# Patient Record
Sex: MALE | URBAN - METROPOLITAN AREA
[De-identification: names, ages, dates, MRNs, and addresses within clinical notes are randomized per-mention and may not be internally consistent; named-entity substitution may affect disease eponyms.]

---

## 2020-12-15 ENCOUNTER — APPOINTMENT (RX ONLY)
Dept: URBAN - METROPOLITAN AREA CLINIC 169 | Facility: CLINIC | Age: 60
Setting detail: DERMATOLOGY
End: 2020-12-15

## 2020-12-15 DIAGNOSIS — Z85.72 PERSONAL HISTORY OF NON-HODGKIN LYMPHOMAS: ICD-10-CM

## 2020-12-15 DIAGNOSIS — L82.1 OTHER SEBORRHEIC KERATOSIS: ICD-10-CM

## 2020-12-15 DIAGNOSIS — D485 NEOPLASM OF UNCERTAIN BEHAVIOR OF SKIN: ICD-10-CM

## 2020-12-15 DIAGNOSIS — D18.0 HEMANGIOMA: ICD-10-CM

## 2020-12-15 DIAGNOSIS — D22 MELANOCYTIC NEVI: ICD-10-CM

## 2020-12-15 DIAGNOSIS — L81.4 OTHER MELANIN HYPERPIGMENTATION: ICD-10-CM

## 2020-12-15 DIAGNOSIS — Z85.820 PERSONAL HISTORY OF MALIGNANT MELANOMA OF SKIN: ICD-10-CM

## 2020-12-15 PROBLEM — D22.5 MELANOCYTIC NEVI OF TRUNK: Status: ACTIVE | Noted: 2020-12-15

## 2020-12-15 PROBLEM — D18.01 HEMANGIOMA OF SKIN AND SUBCUTANEOUS TISSUE: Status: ACTIVE | Noted: 2020-12-15

## 2020-12-15 PROBLEM — D48.5 NEOPLASM OF UNCERTAIN BEHAVIOR OF SKIN: Status: ACTIVE | Noted: 2020-12-15

## 2020-12-15 PROCEDURE — ? ADDITIONAL NOTES

## 2020-12-15 PROCEDURE — ? BIOPSY BY SHAVE METHOD

## 2020-12-15 PROCEDURE — ? COUNSELING

## 2020-12-15 PROCEDURE — 99213 OFFICE O/P EST LOW 20 MIN: CPT | Mod: 25

## 2020-12-15 PROCEDURE — 11102 TANGNTL BX SKIN SINGLE LES: CPT

## 2020-12-15 ASSESSMENT — LOCATION ZONE DERM
LOCATION ZONE: LEG
LOCATION ZONE: TRUNK
LOCATION ZONE: LEG
LOCATION ZONE: TRUNK
LOCATION ZONE: NECK

## 2020-12-15 ASSESSMENT — LOCATION DETAILED DESCRIPTION DERM
LOCATION DETAILED: EPIGASTRIC SKIN
LOCATION DETAILED: RIGHT INFERIOR MEDIAL MIDBACK
LOCATION DETAILED: LEFT MID-UPPER BACK
LOCATION DETAILED: LEFT MID-UPPER BACK
LOCATION DETAILED: RIGHT BUTTOCK
LOCATION DETAILED: LEFT MEDIAL SUPERIOR CHEST
LOCATION DETAILED: RIGHT CLAVICULAR NECK
LOCATION DETAILED: RIGHT DISTAL LATERAL POSTERIOR THIGH
LOCATION DETAILED: RIGHT ANTERIOR PROXIMAL THIGH
LOCATION DETAILED: LEFT SUPERIOR MEDIAL UPPER BACK

## 2020-12-15 ASSESSMENT — LOCATION SIMPLE DESCRIPTION DERM
LOCATION SIMPLE: LEFT UPPER BACK
LOCATION SIMPLE: ABDOMEN
LOCATION SIMPLE: RIGHT THIGH
LOCATION SIMPLE: LEFT UPPER BACK
LOCATION SIMPLE: RIGHT POSTERIOR THIGH
LOCATION SIMPLE: RIGHT LOWER BACK
LOCATION SIMPLE: RIGHT ANTERIOR NECK
LOCATION SIMPLE: RIGHT BUTTOCK
LOCATION SIMPLE: CHEST

## 2020-12-15 NOTE — PROCEDURE: ADDITIONAL NOTES
Additional Notes: Patient consent was obtained to proceed with the visit and recommended plan of care after discussion of all risks and benefits, including the risks of COVID-19 exposure.
Detail Level: Simple
Additional Notes: If positive(MOHS)

## 2023-10-06 ENCOUNTER — APPOINTMENT (RX ONLY)
Dept: URBAN - METROPOLITAN AREA CLINIC 169 | Facility: CLINIC | Age: 63
Setting detail: DERMATOLOGY
End: 2023-10-06

## 2023-10-06 DIAGNOSIS — D18.0 HEMANGIOMA: ICD-10-CM | Status: STABLE

## 2023-10-06 DIAGNOSIS — Z85.828 PERSONAL HISTORY OF OTHER MALIGNANT NEOPLASM OF SKIN: ICD-10-CM

## 2023-10-06 DIAGNOSIS — L81.4 OTHER MELANIN HYPERPIGMENTATION: ICD-10-CM | Status: STABLE

## 2023-10-06 DIAGNOSIS — D22 MELANOCYTIC NEVI: ICD-10-CM | Status: STABLE

## 2023-10-06 DIAGNOSIS — L57.0 ACTINIC KERATOSIS: ICD-10-CM | Status: WORSENING

## 2023-10-06 DIAGNOSIS — D485 NEOPLASM OF UNCERTAIN BEHAVIOR OF SKIN: ICD-10-CM

## 2023-10-06 DIAGNOSIS — Z85.72 PERSONAL HISTORY OF NON-HODGKIN LYMPHOMAS: ICD-10-CM

## 2023-10-06 DIAGNOSIS — L82.1 OTHER SEBORRHEIC KERATOSIS: ICD-10-CM | Status: STABLE

## 2023-10-06 DIAGNOSIS — Z85.820 PERSONAL HISTORY OF MALIGNANT MELANOMA OF SKIN: ICD-10-CM | Status: STABLE

## 2023-10-06 PROBLEM — D48.5 NEOPLASM OF UNCERTAIN BEHAVIOR OF SKIN: Status: ACTIVE | Noted: 2023-10-06

## 2023-10-06 PROBLEM — D18.01 HEMANGIOMA OF SKIN AND SUBCUTANEOUS TISSUE: Status: ACTIVE | Noted: 2023-10-06

## 2023-10-06 PROBLEM — D22.5 MELANOCYTIC NEVI OF TRUNK: Status: ACTIVE | Noted: 2023-10-06

## 2023-10-06 PROCEDURE — ? FULL BODY SKIN EXAM

## 2023-10-06 PROCEDURE — ? SUNSCREEN RECOMMENDATIONS

## 2023-10-06 PROCEDURE — ? BIOPSY BY SHAVE METHOD

## 2023-10-06 PROCEDURE — ? COUNSELING

## 2023-10-06 PROCEDURE — 11102 TANGNTL BX SKIN SINGLE LES: CPT

## 2023-10-06 PROCEDURE — 99204 OFFICE O/P NEW MOD 45 MIN: CPT | Mod: 25

## 2023-10-06 PROCEDURE — ? PRESCRIPTION

## 2023-10-06 RX ORDER — TRETIONIN 1 MG/G
CREAM TOPICAL QHS
Qty: 45 | Refills: 0 | Status: ERX | COMMUNITY
Start: 2023-10-06

## 2023-10-06 RX ORDER — FLUOROURACIL 5 MG/G
CREAM TOPICAL BID
Qty: 40 | Refills: 0 | Status: ERX | COMMUNITY
Start: 2023-10-06

## 2023-10-06 RX ADMIN — FLUOROURACIL: 5 CREAM TOPICAL at 00:00

## 2023-10-06 RX ADMIN — TRETIONIN: 1 CREAM TOPICAL at 00:00

## 2023-10-06 ASSESSMENT — LOCATION DETAILED DESCRIPTION DERM
LOCATION DETAILED: LEFT MID-UPPER BACK
LOCATION DETAILED: LEFT INFERIOR UPPER BACK
LOCATION DETAILED: LEFT SUPERIOR HELIX
LOCATION DETAILED: RIGHT SUPERIOR HELIX
LOCATION DETAILED: RIGHT MEDIAL TEMPLE
LOCATION DETAILED: SUPERIOR THORACIC SPINE
LOCATION DETAILED: LEFT CENTRAL TEMPLE
LOCATION DETAILED: LEFT MEDIAL UPPER BACK
LOCATION DETAILED: RIGHT SUPERIOR MEDIAL UPPER BACK

## 2023-10-06 ASSESSMENT — LOCATION SIMPLE DESCRIPTION DERM
LOCATION SIMPLE: UPPER BACK
LOCATION SIMPLE: LEFT EAR
LOCATION SIMPLE: RIGHT TEMPLE
LOCATION SIMPLE: RIGHT EAR
LOCATION SIMPLE: LEFT UPPER BACK
LOCATION SIMPLE: RIGHT UPPER BACK
LOCATION SIMPLE: LEFT TEMPLE

## 2023-10-06 ASSESSMENT — LOCATION ZONE DERM
LOCATION ZONE: TRUNK
LOCATION ZONE: EAR
LOCATION ZONE: FACE

## 2023-10-06 NOTE — HPI: EVALUATION OF SKIN LESION(S)
Hpi Title: Evaluation of Skin Lesions
Additional History: Patient states he self treats his seborrheic keratosis with Retina-A 0.1% and would like a refill